# Patient Record
Sex: MALE | Race: ASIAN | NOT HISPANIC OR LATINO | URBAN - METROPOLITAN AREA
[De-identification: names, ages, dates, MRNs, and addresses within clinical notes are randomized per-mention and may not be internally consistent; named-entity substitution may affect disease eponyms.]

---

## 2019-02-01 ENCOUNTER — OUTPATIENT (OUTPATIENT)
Dept: OUTPATIENT SERVICES | Facility: HOSPITAL | Age: 29
LOS: 1 days | End: 2019-02-01
Payer: COMMERCIAL

## 2019-02-01 ENCOUNTER — APPOINTMENT (OUTPATIENT)
Dept: CT IMAGING | Facility: IMAGING CENTER | Age: 29
End: 2019-02-01
Payer: COMMERCIAL

## 2019-02-01 DIAGNOSIS — Z00.8 ENCOUNTER FOR OTHER GENERAL EXAMINATION: ICD-10-CM

## 2019-02-01 PROCEDURE — 74177 CT ABD & PELVIS W/CONTRAST: CPT

## 2019-02-01 PROCEDURE — 74177 CT ABD & PELVIS W/CONTRAST: CPT | Mod: 26

## 2021-03-02 DIAGNOSIS — Z00.00 ENCOUNTER FOR GENERAL ADULT MEDICAL EXAMINATION W/OUT ABNORMAL FINDINGS: ICD-10-CM

## 2021-12-19 ENCOUNTER — EMERGENCY (EMERGENCY)
Facility: HOSPITAL | Age: 31
LOS: 1 days | Discharge: HOME | End: 2021-12-19
Attending: STUDENT IN AN ORGANIZED HEALTH CARE EDUCATION/TRAINING PROGRAM | Admitting: STUDENT IN AN ORGANIZED HEALTH CARE EDUCATION/TRAINING PROGRAM
Payer: SELF-PAY

## 2021-12-19 VITALS
OXYGEN SATURATION: 100 % | SYSTOLIC BLOOD PRESSURE: 129 MMHG | TEMPERATURE: 98 F | HEART RATE: 93 BPM | RESPIRATION RATE: 16 BRPM | DIASTOLIC BLOOD PRESSURE: 77 MMHG

## 2021-12-19 LAB
ALBUMIN SERPL ELPH-MCNC: 4.4 G/DL — SIGNIFICANT CHANGE UP (ref 3.3–5)
ALP SERPL-CCNC: 68 U/L — SIGNIFICANT CHANGE UP (ref 40–120)
ALT FLD-CCNC: 24 U/L — SIGNIFICANT CHANGE UP (ref 4–41)
ANION GAP SERPL CALC-SCNC: 9 MMOL/L — SIGNIFICANT CHANGE UP (ref 7–14)
AST SERPL-CCNC: 16 U/L — SIGNIFICANT CHANGE UP (ref 4–40)
BASOPHILS # BLD AUTO: 0.05 K/UL — SIGNIFICANT CHANGE UP (ref 0–0.2)
BASOPHILS NFR BLD AUTO: 0.7 % — SIGNIFICANT CHANGE UP (ref 0–2)
BILIRUB SERPL-MCNC: 0.3 MG/DL — SIGNIFICANT CHANGE UP (ref 0.2–1.2)
BUN SERPL-MCNC: 15 MG/DL — SIGNIFICANT CHANGE UP (ref 7–23)
CALCIUM SERPL-MCNC: 9.4 MG/DL — SIGNIFICANT CHANGE UP (ref 8.4–10.5)
CHLORIDE SERPL-SCNC: 103 MMOL/L — SIGNIFICANT CHANGE UP (ref 98–107)
CO2 SERPL-SCNC: 27 MMOL/L — SIGNIFICANT CHANGE UP (ref 22–31)
CREAT SERPL-MCNC: 0.94 MG/DL — SIGNIFICANT CHANGE UP (ref 0.5–1.3)
EOSINOPHIL # BLD AUTO: 0.1 K/UL — SIGNIFICANT CHANGE UP (ref 0–0.5)
EOSINOPHIL NFR BLD AUTO: 1.5 % — SIGNIFICANT CHANGE UP (ref 0–6)
GLUCOSE SERPL-MCNC: 110 MG/DL — HIGH (ref 70–99)
HCT VFR BLD CALC: 41.1 % — SIGNIFICANT CHANGE UP (ref 39–50)
HGB BLD-MCNC: 13.7 G/DL — SIGNIFICANT CHANGE UP (ref 13–17)
IANC: 2.99 K/UL — SIGNIFICANT CHANGE UP (ref 1.5–8.5)
IMM GRANULOCYTES NFR BLD AUTO: 0.3 % — SIGNIFICANT CHANGE UP (ref 0–1.5)
LIDOCAIN IGE QN: 48 U/L — SIGNIFICANT CHANGE UP (ref 7–60)
LYMPHOCYTES # BLD AUTO: 3.01 K/UL — SIGNIFICANT CHANGE UP (ref 1–3.3)
LYMPHOCYTES # BLD AUTO: 45 % — HIGH (ref 13–44)
MAGNESIUM SERPL-MCNC: 1.9 MG/DL — SIGNIFICANT CHANGE UP (ref 1.6–2.6)
MCHC RBC-ENTMCNC: 28.3 PG — SIGNIFICANT CHANGE UP (ref 27–34)
MCHC RBC-ENTMCNC: 33.3 GM/DL — SIGNIFICANT CHANGE UP (ref 32–36)
MCV RBC AUTO: 84.9 FL — SIGNIFICANT CHANGE UP (ref 80–100)
MONOCYTES # BLD AUTO: 0.52 K/UL — SIGNIFICANT CHANGE UP (ref 0–0.9)
MONOCYTES NFR BLD AUTO: 7.8 % — SIGNIFICANT CHANGE UP (ref 2–14)
NEUTROPHILS # BLD AUTO: 2.99 K/UL — SIGNIFICANT CHANGE UP (ref 1.8–7.4)
NEUTROPHILS NFR BLD AUTO: 44.7 % — SIGNIFICANT CHANGE UP (ref 43–77)
NRBC # BLD: 0 /100 WBCS — SIGNIFICANT CHANGE UP
NRBC # FLD: 0 K/UL — SIGNIFICANT CHANGE UP
PHOSPHATE SERPL-MCNC: 3.7 MG/DL — SIGNIFICANT CHANGE UP (ref 2.5–4.5)
PLATELET # BLD AUTO: 264 K/UL — SIGNIFICANT CHANGE UP (ref 150–400)
POTASSIUM SERPL-MCNC: 3.7 MMOL/L — SIGNIFICANT CHANGE UP (ref 3.5–5.3)
POTASSIUM SERPL-SCNC: 3.7 MMOL/L — SIGNIFICANT CHANGE UP (ref 3.5–5.3)
PROT SERPL-MCNC: 7.1 G/DL — SIGNIFICANT CHANGE UP (ref 6–8.3)
RBC # BLD: 4.84 M/UL — SIGNIFICANT CHANGE UP (ref 4.2–5.8)
RBC # FLD: 11.6 % — SIGNIFICANT CHANGE UP (ref 10.3–14.5)
SODIUM SERPL-SCNC: 139 MMOL/L — SIGNIFICANT CHANGE UP (ref 135–145)
TROPONIN T, HIGH SENSITIVITY RESULT: <6 NG/L — SIGNIFICANT CHANGE UP
WBC # BLD: 6.69 K/UL — SIGNIFICANT CHANGE UP (ref 3.8–10.5)
WBC # FLD AUTO: 6.69 K/UL — SIGNIFICANT CHANGE UP (ref 3.8–10.5)

## 2021-12-19 PROCEDURE — 99284 EMERGENCY DEPT VISIT MOD MDM: CPT

## 2021-12-19 PROCEDURE — 71046 X-RAY EXAM CHEST 2 VIEWS: CPT | Mod: 26

## 2021-12-19 RX ORDER — IBUPROFEN 200 MG
600 TABLET ORAL ONCE
Refills: 0 | Status: COMPLETED | OUTPATIENT
Start: 2021-12-19 | End: 2021-12-19

## 2021-12-19 RX ORDER — ONDANSETRON 8 MG/1
4 TABLET, FILM COATED ORAL ONCE
Refills: 0 | Status: COMPLETED | OUTPATIENT
Start: 2021-12-19 | End: 2021-12-19

## 2021-12-19 RX ADMIN — ONDANSETRON 4 MILLIGRAM(S): 8 TABLET, FILM COATED ORAL at 02:54

## 2021-12-19 RX ADMIN — Medication 600 MILLIGRAM(S): at 02:54

## 2021-12-19 NOTE — ED ADULT NURSE NOTE - OBJECTIVE STATEMENT
Pt received in room 7. Pt A&Ox4, c/o 1 week of left sided intermittent positional chest pain after he lifted heavy boxes. Pt states the pain is worse with twisting and that he had 1 episode of nausea while on way to ED. Pt denies any sob, abd pain, ha, dizziness, diaphoresis, v/d, fevers/chills. Respirations even and unlabored, no accessory muscle use. 20g placed to L AC, labs sent. Stretcher in lowest position, side rail up, call bell within reach.

## 2021-12-19 NOTE — ED PROVIDER NOTE - NSFOLLOWUPINSTRUCTIONS_ED_ALL_ED_FT
1. TAKE ALL MEDICATIONS AS DIRECTED.  FOR PAIN YOU CAN TAKE IBUPROFEN (MOTRIN, ADVIL) OR ACETAMINOPHEN (TYLENOL) AS NEEDED, AS DIRECTED ON PACKAGING.  2. FOLLOW UP WITH primary care doctor AS DIRECTED.  YOU WERE GIVEN COPIES OF ALL LABS AND IMAGING RESULTS FROM YOUR ER VISIT--PLEASE TAKE THEM WITH YOU TO YOUR APPOINTMENT.  3. IF NEEDED, CALL [phone] TO FIND A PRIMARY CARE PHYSICIAN.  OR CALL [phone] TO MAKE AN APPOINTMENT WITH THE MEDICAL CLINIC.  4. RETURN TO THE ER FOR ANY WORSENING SYMPTOMS.     Chest Pain    Chest pain can be caused by many different conditions which may or may not be dangerous. Causes include heartburn, lung infections, heart attack, blood clot in lungs, skin infections, strain or damage to muscle, cartilage, or bones, etc. In addition to a history and physical examination, an electrocardiogram (ECG) or other lab tests may have been performed to determine the cause of your chest pain. Follow up with your primary care provider or with a cardiologist as instructed.     Recommend taking aspirin 81mg once daily until you see your primary medical doctor or your cardiologist.    SEEK IMMEDIATE MEDICAL CARE IF YOU HAVE ANY OF THE FOLLOWING SYMPTOMS: worsening chest pain, coughing up blood, unexplained back/neck/jaw pain, severe abdominal pain, dizziness or lightheadedness, fainting, shortness of breath, sweaty or clammy skin, vomiting, or racing heart beat. These symptoms may represent a serious problem that is an emergency. Do not wait to see if the symptoms will go away. Get medical help right away. Call [phone] and do not drive yourself to the hospital.

## 2021-12-19 NOTE — ED PROVIDER NOTE - PROGRESS NOTE DETAILS
Franny Flynn DO (PGY1): Labs nonactionably, ecg NSR. Pt feeling better after pain medication. Pt made aware of lab and imaging results. Questions regarding their symptoms were addressed. Advised to follow up with primary care doctor. Given strict return precautions. Pt verbalized understanding.

## 2021-12-19 NOTE — ED PROVIDER NOTE - OBJECTIVE STATEMENT
31M no PMH complaining of 1 week of worsening intermittent positional chest pressure after lifting heavy boxes. Pain is 6/10, worse with twisting motion, non-exertional, non-pleuritic. 1 episode of nausea today before arriving at ED which has currently resolved, no vomiting or diaphoresis. No associated SOB, cough, fever, chills, abdominal pain. No family hx of heart attack, no hx of smoking.

## 2021-12-19 NOTE — ED PROVIDER NOTE - PHYSICAL EXAMINATION
GENERAL: Awake. Alert. NAD. Well nourished.  HEENT: NC/AT, Conjunctiva pink, no scleral icterus. Airway patent. Moist mucous membranes.  LUNGS: CTAB. No wheezes or rales noted.  CARDIAC: Chest TTP at L anterior chest. RRR. S1 and S2 intact. No murmurs noted.  ABDOMEN: No masses noted. Soft, NT, ND, no rebound, no guarding.  EXT: No edema, no calf tenderness, distal pulses 2+ bilaterally  NEURO: A&Ox3. Moving all extremities. Sensation and strength intact throughout.   SKIN: Warm and dry. No rash on exposed skin.  PSYCH: Normal affect.

## 2021-12-19 NOTE — ED PROVIDER NOTE - PATIENT PORTAL LINK FT
You can access the FollowMyHealth Patient Portal offered by [facility] by registering at the following website: [email or web address]. By joining [employer]’s FollowMyHealth portal, you will also be able to view your health information using other applications (apps) compatible with our system.

## 2021-12-19 NOTE — ED PROVIDER NOTE - ATTENDING CONTRIBUTION TO CARE
31M y/o M with no PMH p/w  week of worsening intermittent positional chest pressure after lifting heavy boxes. Pain is 6/10, worse with twisting motion. Pain worse with movement. Located in the left sided of the chest over sterum. He denies sob, he states he had mild nausea with the pain. He has not taken any medications.  denies fever, chills, +chest pain, SOB, abdominal pain, diarrhea, dysuria, syncope, bleeding, new rash,weakness, numbness, blurred vision    ROS  otherwise negative as per HPI  Gen: Awake, Alert, WD, WN, NAD  Head:  NC/AT  Eyes:  PERRL, EOMI, Conjunctiva pink, lids normal, no scleral icterus  ENT: OP clear, no exudates, no erythema, uvula midline, TMs clear bilaterally, moist mucus membranes  Neck: supple, nontender, no meningismus, no JVD, trachea midline  Cardiac/CV:  S1 S2, RRR, no M/G/R  Respiratory/Pulm:  CTAB, good air movement, normal resp effort, no wheezes/stridor/retractions/rales/rhonchi  Gastrointestinal/Abdomen:  Soft, nontender, nondistended, +BS, no rebound/guarding  Back:  no CVAT, no MLT  Ext:  warm, well perfused, moving all extremities spontaneously, no peripheral edema, distal pulses intact  Skin: intact, no rash  Neuro:  AAOx3, sensation intact, motor 5/5 x 4 extremities, normal gait, speech clear  MDM as above

## 2021-12-19 NOTE — ED PROVIDER NOTE - CLINICAL SUMMARY MEDICAL DECISION MAKING FREE TEXT BOX
31M no PMH complaining of 1 week of worsening intermittent positional chest pressure after lifting heavy boxes. Pain is 6/10, worse with twisting motion, non-exertional, non-pleuritic. 1 episode of nausea today before arriving at ED which has currently resolved, no vomiting or diaphoresis. No associated SOB, cough, fever, chills, abdominal pain. No family hx of heart attack, no hx of smoking. Hemodynamically stable, afebrile, Chest TTP at L anterior chest. Lungs CTA. No calf swelling or tenderness. No rash on exposed skin. Likely musculoskeletal strain/costochondritis, low risk ACS, low heart score. Will order ecg, trop, lipase, labs, meds, reassess and likely dc home. 31M no PMH complaining of 1 week of worsening intermittent positional chest pressure after lifting heavy boxes. Pain is 6/10, worse with twisting motion, non-exertional, non-pleuritic. 1 episode of nausea today before arriving at ED which has currently resolved, no vomiting or diaphoresis. No associated SOB, cough, fever, chills, abdominal pain. No family hx of heart attack, no hx of smoking. Hemodynamically stable, afebrile, Chest TTP at L anterior chest. Lungs CTA. No calf swelling or tenderness. PERC negative. No rash on exposed skin. Likely musculoskeletal strain/costochondritis, low risk ACS, low heart score. Will order ecg, trop, lipase, labs, meds, reassess and likely dc home.

## 2021-12-19 NOTE — ED ADULT TRIAGE NOTE - CHIEF COMPLAINT QUOTE
presents to ED C/O Chest pain x1 week got worse today that radiates to left arm. No complaints of, headache, nausea, dizziness, vomiting  SOB, fever, chills verbalized.  PMhx HLD no distress noted. EKG in progress